# Patient Record
Sex: MALE | Race: WHITE | Employment: FULL TIME | URBAN - METROPOLITAN AREA
[De-identification: names, ages, dates, MRNs, and addresses within clinical notes are randomized per-mention and may not be internally consistent; named-entity substitution may affect disease eponyms.]

---

## 2019-09-21 ENCOUNTER — HOSPITAL ENCOUNTER (EMERGENCY)
Facility: CLINIC | Age: 44
Discharge: HOME | End: 2019-09-21
Attending: NURSE PRACTITIONER | Admitting: NURSE PRACTITIONER

## 2019-09-21 VITALS
OXYGEN SATURATION: 97 % | DIASTOLIC BLOOD PRESSURE: 90 MMHG | TEMPERATURE: 98 F | RESPIRATION RATE: 16 BRPM | SYSTOLIC BLOOD PRESSURE: 165 MMHG | HEART RATE: 80 BPM | HEIGHT: 63 IN

## 2019-09-21 DIAGNOSIS — M54.9 BACK PAIN: ICD-10-CM

## 2019-09-21 PROCEDURE — 25000132 ZZH RX MED GY IP 250 OP 250 PS 637: Performed by: NURSE PRACTITIONER

## 2019-09-21 PROCEDURE — G0463 HOSPITAL OUTPT CLINIC VISIT: HCPCS | Performed by: NURSE PRACTITIONER

## 2019-09-21 PROCEDURE — 99204 OFFICE O/P NEW MOD 45 MIN: CPT | Mod: Z6 | Performed by: NURSE PRACTITIONER

## 2019-09-21 RX ORDER — OXYCODONE HYDROCHLORIDE 5 MG/1
5 TABLET ORAL ONCE
Status: COMPLETED | OUTPATIENT
Start: 2019-09-21 | End: 2019-09-21

## 2019-09-21 RX ORDER — METHYLPREDNISOLONE 4 MG
TABLET, DOSE PACK ORAL
Qty: 21 TABLET | Refills: 0 | Status: SHIPPED | OUTPATIENT
Start: 2019-09-21

## 2019-09-21 RX ORDER — HYDROCODONE BITARTRATE AND ACETAMINOPHEN 5; 325 MG/1; MG/1
1 TABLET ORAL
COMMUNITY
Start: 2019-09-08

## 2019-09-21 RX ORDER — ZOLPIDEM TARTRATE 10 MG/1
10 TABLET ORAL
COMMUNITY
Start: 2019-03-05 | End: 2019-10-31

## 2019-09-21 RX ORDER — LAMOTRIGINE 100 MG/1
100 TABLET ORAL
COMMUNITY
Start: 2019-03-05 | End: 2020-03-04

## 2019-09-21 RX ORDER — OXYCODONE HYDROCHLORIDE 5 MG/1
5-10 TABLET ORAL
COMMUNITY
Start: 2019-06-18

## 2019-09-21 RX ORDER — CYCLOBENZAPRINE HCL 10 MG
10 TABLET ORAL
COMMUNITY
Start: 2019-09-20

## 2019-09-21 RX ORDER — RISPERIDONE 2 MG/1
2 TABLET ORAL
COMMUNITY
Start: 2019-03-05

## 2019-09-21 RX ADMIN — OXYCODONE HYDROCHLORIDE 5 MG: 5 TABLET ORAL at 17:51

## 2019-09-21 NOTE — ED AVS SNAPSHOT
[facility] Department  [address] [facility] [address]  Phone:  [phone]  Fax:  [phone]                                    Lux Riley   MRN: [id number]    Department:  [facility] Emergency Department   Date of Visit:  9/21/2019           After Visit Summary Signature Page    I have received my discharge instructions, and my questions have been answered. I have discussed any challenges I see with this plan with the nurse or doctor.    ..........................................................................................................................................  Patient/Patient Representative Signature      ..........................................................................................................................................  Patient Representative Print Name and Relationship to Patient    ..................................................               ................................................  Date                                   Time    ..........................................................................................................................................  Reviewed by Signature/Title    ...................................................              ..............................................  Date                                               Time          22EPIC Rev 08/18

## 2019-09-21 NOTE — ED PROVIDER NOTES
History     Chief Complaint   Patient presents with     Back Pain     HPI  Lux Riley is a 43 year old male who presents to the urgent care for low back pain. Pt was seen in [place] ED yesterday for similar complaints, reported urinary incontinence, MRI done at that time, he was sent home with flexeril.  Pt reports since then his symptoms are the same but today they are worse.  He is having low back pain, radiation to his left leg, cramping sensation in his left leg, one episode of urinary incontinence today. Denies painful urination and states that the incontinence occurs with the extremely painful back spasms.  He rates his pain at a 12-13 on a 0-10 pain scale.  Pt denies trauma, abd pain, weakness, foot drop, inability to walk, numbness/tingling, stool incontinence, previous back surgery, previous IV drug use. He has had steroid injections in the past and gone to physical therapy however not recently. He has not f/u with PCP since his last ED visit and states that he doesn't have one yet. He has tried flexeril for pain but that doesn't help at all.  Per [place] prescription monitoring pt has received #2 rx for norco and oxycodone in the past yr with the last script being about 2 weeks ago. Denies other recent illness or injury. Denies concerns regarding chronic medical problems. No other medical concerns at this time.      Allergies:  Allergies   Allergen Reactions     Contrast Dye Itching       Problem List:    There are no active problems to display for this patient.       Past Medical History:    History reviewed. No pertinent past medical history.    Past Surgical History:    No past surgical history on file.    Family History:    No family history on file.    Social History:  Marital Status:    Social History     Tobacco Use     Smoking status: None   Substance Use Topics     Alcohol use: None     Drug use: None        Medications:      cyclobenzaprine (FLEXERIL) 10 MG tablet   HYDROcodone-acetaminophen  "(NORCO) 5-325 MG tablet   lamoTRIgine (LAMICTAL) 100 MG tablet   oxyCODONE (ROXICODONE) 5 MG tablet   risperiDONE (RISPERDAL) 2 MG tablet   zolpidem (AMBIEN) 10 MG tablet   aspirin-acetaminophen-caffeine (EXCEDRIN MIGRAINE) 250-250-65 MG tablet   omeprazole (PRILOSEC) 20 MG DR capsule         Review of Systems   Constitutional: Positive for activity change. Negative for chills, fatigue and fever.   Respiratory: Negative for shortness of breath.    Cardiovascular: Negative for chest pain.   Gastrointestinal: Negative for abdominal pain.   Genitourinary:        Incontinence of urine not stool   Musculoskeletal: Positive for back pain and gait problem.   Skin: Negative for rash and wound.   Neurological: Positive for weakness and numbness.       Physical Exam   BP: (!) 165/90  Pulse: 80  Temp: 98  F (36.7  C)  Resp: 16  Height: 160 cm (5' 3\")  SpO2: 97 %    Physical Examination  General: cooperative, alert  Head: without obvious abnormality, atraumatic   Eyes: PERRL, conjunctiva/sclera clear   Neck: symmetrical, trachea midline, no adenopathy, no posterior cervical tenderness   Back: Symmetric, no curvature, ROM normal, no CVA tenderness, pt points to lower lumbar region as area of pain, no tenderness over midline, no tenderness over SI joints, no ischial tenderness, neg straight leg raise bilaterally with no radiculopathy, strength 5/5 equal, sensation intact light touch, gait stable but slow due to pain, no foot drop, pt able to walk on heels and toes without difficulty  Lungs: Clear to auscultation bilaterally, respirations unlabored   Heart: Regular rate and rhythm, S1 and S2 normal, no murmur, rub or gallop   Abdomen: bowel sounds active all four quadrants, soft, non-tender, no masses, no organomegaly   Extremities:Extremities normal, atraumatic, no cyanosis or edema   Pulses:2+ and symmetric all extremities   Skin:Skin color, texture, turgor normal, no rashes or lesions   Neurologic:CNII-XII intact, normal " strength, sensation intact, gait stable, no focal deficits noted      ED Course     No results found for this or any previous visit (from the past 24 hour(s)).    Medications   oxyCODONE (ROXICODONE) tablet 5 mg (5 mg Oral Given 9/21/19 9089)       Assessments & Plan (with Medical Decision Making)   Lux is a who came to the urgent care today for worsening of his chronic back pain.  He states that he is hopeful for pain relief he was seen yesterday in emergency department where they did an MRI and sent him home on flexeril.  The MRI results were as follows: IMPRESSION: 1. At L5-S1, mild grade 1 anterolisthesis associated with chronic bilateral L5 pars defects. Mild to moderate bilateral neural foraminal narrowing. 2. No significant spondylosis at additional levels. Dictated by Cleve Resendiz MD @ Sep 20 2019 12:34PM Signed by Dr. Cleve Resendiz @ Sep 20 2019 12:47PM    We will treat Lux today with a one time dose of pain medication and a medrol dose pack in hopes of decreasing swelling and therefore decreasing pain in his back.  It was recommended that Lux see a spine specialist, perhaps at [facility] or a similar place, for further evaluation and treatment.       I have reviewed the nursing notes.    I have reviewed the findings, diagnosis, plan and need for follow up with the patient.      Discharge Medication List as of 9/21/2019  5:57 PM      START taking these medications    Details   methylPREDNISolone (MEDROL DOSEPAK) 4 MG tablet therapy pack Follow Package Directions, Disp-21 tablet, R-0, E-Prescribe             Final diagnoses:   Back pain     -Refused UA/UC test for possible UTI.  -Medrol Dose pack  -Ok to use Ice/warm packs as needed for pain  -Establish with PCP on Monday  -Call [facility] for initial consultation   -Ok to use tylenol/ibuprofen for pain  -1 oxycodone today in our urgent care  -Note given to excuse Lux from work tonight and last night  -Lux was thankful for care  today and denies further questions or concerns    9/21/2019   [facility] EMERGENCY DEPARTMENT     Shy Smith, KASEY CNP  09/21/19 6240

## 2019-09-21 NOTE — LETTER
September 21, 2019      To Whom It May Concern:      Lux Riley was seen in our Urgent Care today, 09/21/19.  Please excuse Lux from work last night and tonight.  He may return to work/school when Wednesday for his scheduled shifts.    Sincerely,        KASEY Mackey CNP

## 2019-09-21 NOTE — DISCHARGE INSTRUCTIONS
-Medrol Dose pack  -Ok to use Ice/warm packs as needed for pain  -Establish with PCP on Monday  -Call [facility] for referral   -Ok to use tylenol/ibuprofen for pain  -1 oxycodone today in our urgent care

## 2019-09-27 ASSESSMENT — ENCOUNTER SYMPTOMS
CHILLS: 0
NUMBNESS: 1
FATIGUE: 0
WOUND: 0
FEVER: 0
WEAKNESS: 1
SHORTNESS OF BREATH: 0
ACTIVITY CHANGE: 1
ABDOMINAL PAIN: 0
BACK PAIN: 1

## 2020-03-11 ENCOUNTER — HEALTH MAINTENANCE LETTER (OUTPATIENT)
Age: 45
End: 2020-03-11

## 2021-01-03 ENCOUNTER — HEALTH MAINTENANCE LETTER (OUTPATIENT)
Age: 46
End: 2021-01-03

## 2021-04-25 ENCOUNTER — HEALTH MAINTENANCE LETTER (OUTPATIENT)
Age: 46
End: 2021-04-25

## 2021-10-10 ENCOUNTER — HEALTH MAINTENANCE LETTER (OUTPATIENT)
Age: 46
End: 2021-10-10

## 2022-05-22 ENCOUNTER — HEALTH MAINTENANCE LETTER (OUTPATIENT)
Age: 47
End: 2022-05-22

## 2022-09-18 ENCOUNTER — HEALTH MAINTENANCE LETTER (OUTPATIENT)
Age: 47
End: 2022-09-18

## 2023-06-04 ENCOUNTER — HEALTH MAINTENANCE LETTER (OUTPATIENT)
Age: 48
End: 2023-06-04